# Patient Record
Sex: MALE | Race: WHITE | Employment: OTHER | URBAN - METROPOLITAN AREA
[De-identification: names, ages, dates, MRNs, and addresses within clinical notes are randomized per-mention and may not be internally consistent; named-entity substitution may affect disease eponyms.]

---

## 2017-01-09 ENCOUNTER — TELEPHONE (OUTPATIENT)
Dept: FAMILY MEDICINE CLINIC | Age: 27
End: 2017-01-09

## 2017-01-09 DIAGNOSIS — R79.89 ELEVATED LFTS: Primary | ICD-10-CM

## 2017-02-14 ENCOUNTER — OFFICE VISIT (OUTPATIENT)
Dept: FAMILY MEDICINE CLINIC | Age: 27
End: 2017-02-14

## 2017-02-14 VITALS
RESPIRATION RATE: 18 BRPM | SYSTOLIC BLOOD PRESSURE: 146 MMHG | WEIGHT: 220.2 LBS | BODY MASS INDEX: 28.26 KG/M2 | HEIGHT: 74 IN | DIASTOLIC BLOOD PRESSURE: 92 MMHG | HEART RATE: 112 BPM

## 2017-02-14 DIAGNOSIS — R79.89 ABNORMAL LFTS (LIVER FUNCTION TESTS): Primary | ICD-10-CM

## 2017-02-14 PROCEDURE — 99213 OFFICE O/P EST LOW 20 MIN: CPT | Performed by: INTERNAL MEDICINE

## 2017-02-14 ASSESSMENT — ENCOUNTER SYMPTOMS
RESPIRATORY NEGATIVE: 1
GASTROINTESTINAL NEGATIVE: 1

## 2017-05-18 ENCOUNTER — OFFICE VISIT (OUTPATIENT)
Dept: FAMILY MEDICINE CLINIC | Age: 27
End: 2017-05-18

## 2017-05-18 VITALS
DIASTOLIC BLOOD PRESSURE: 84 MMHG | WEIGHT: 211.4 LBS | SYSTOLIC BLOOD PRESSURE: 128 MMHG | HEIGHT: 74 IN | RESPIRATION RATE: 18 BRPM | BODY MASS INDEX: 27.13 KG/M2 | HEART RATE: 80 BPM

## 2017-05-18 DIAGNOSIS — L72.9 CYST OF SCROTUM: Primary | ICD-10-CM

## 2017-05-18 PROCEDURE — 99212 OFFICE O/P EST SF 10 MIN: CPT | Performed by: INTERNAL MEDICINE

## 2017-05-18 ASSESSMENT — ENCOUNTER SYMPTOMS
ALLERGIC/IMMUNOLOGIC NEGATIVE: 1
RESPIRATORY NEGATIVE: 1
GASTROINTESTINAL NEGATIVE: 1

## 2018-07-12 ENCOUNTER — OFFICE VISIT (OUTPATIENT)
Dept: FAMILY MEDICINE CLINIC | Age: 28
End: 2018-07-12

## 2018-07-12 VITALS
BODY MASS INDEX: 28.18 KG/M2 | DIASTOLIC BLOOD PRESSURE: 80 MMHG | HEART RATE: 70 BPM | RESPIRATION RATE: 16 BRPM | HEIGHT: 74 IN | SYSTOLIC BLOOD PRESSURE: 126 MMHG | WEIGHT: 219.6 LBS | OXYGEN SATURATION: 98 %

## 2018-07-12 DIAGNOSIS — G47.33 OBSTRUCTIVE SLEEP APNEA SYNDROME: ICD-10-CM

## 2018-07-12 DIAGNOSIS — B36.0 TINEA VERSICOLOR: ICD-10-CM

## 2018-07-12 DIAGNOSIS — R79.89 ABNORMAL LFTS (LIVER FUNCTION TESTS): ICD-10-CM

## 2018-07-12 LAB
ALBUMIN SERPL-MCNC: 4.8 G/DL (ref 3.4–5)
ALP BLD-CCNC: 52 U/L (ref 40–129)
ALT SERPL-CCNC: 39 U/L (ref 10–40)
ANION GAP SERPL CALCULATED.3IONS-SCNC: 13 MMOL/L (ref 3–16)
AST SERPL-CCNC: 27 U/L (ref 15–37)
BILIRUB SERPL-MCNC: 1.2 MG/DL (ref 0–1)
BILIRUBIN DIRECT: <0.2 MG/DL (ref 0–0.3)
BILIRUBIN, INDIRECT: ABNORMAL MG/DL (ref 0–1)
BUN BLDV-MCNC: 15 MG/DL (ref 7–20)
CALCIUM SERPL-MCNC: 9.2 MG/DL (ref 8.3–10.6)
CHLORIDE BLD-SCNC: 103 MMOL/L (ref 99–110)
CHOLESTEROL, TOTAL: 182 MG/DL (ref 0–199)
CO2: 24 MMOL/L (ref 21–32)
CREAT SERPL-MCNC: 1.2 MG/DL (ref 0.9–1.3)
GFR AFRICAN AMERICAN: >60
GFR NON-AFRICAN AMERICAN: >60
GLUCOSE BLD-MCNC: 87 MG/DL (ref 70–99)
HDLC SERPL-MCNC: 43 MG/DL (ref 40–60)
LDL CHOLESTEROL CALCULATED: 121 MG/DL
POTASSIUM SERPL-SCNC: 4.5 MMOL/L (ref 3.5–5.1)
SODIUM BLD-SCNC: 140 MMOL/L (ref 136–145)
TOTAL PROTEIN: 6.7 G/DL (ref 6.4–8.2)
TRIGL SERPL-MCNC: 89 MG/DL (ref 0–150)
VLDLC SERPL CALC-MCNC: 18 MG/DL

## 2018-07-12 PROCEDURE — 36415 COLL VENOUS BLD VENIPUNCTURE: CPT | Performed by: INTERNAL MEDICINE

## 2018-07-12 PROCEDURE — 99214 OFFICE O/P EST MOD 30 MIN: CPT | Performed by: INTERNAL MEDICINE

## 2018-07-12 ASSESSMENT — ENCOUNTER SYMPTOMS
GASTROINTESTINAL NEGATIVE: 1
RESPIRATORY NEGATIVE: 1
ALLERGIC/IMMUNOLOGIC NEGATIVE: 1

## 2018-07-12 NOTE — PROGRESS NOTES
Cardiovascular: Normal rate, regular rhythm, normal heart sounds, intact distal pulses and normal pulses. No extrasystoles are present. PMI is not displaced. Exam reveals no gallop and no friction rub. No murmur heard. Pulses:       Carotid pulses are 2+ on the right side, and 2+ on the left side. Radial pulses are 2+ on the right side, and 2+ on the left side. Femoral pulses are 2+ on the right side, and 2+ on the left side. Popliteal pulses are 2+ on the right side, and 2+ on the left side. Dorsalis pedis pulses are 2+ on the right side, and 2+ on the left side. Posterior tibial pulses are 2+ on the right side, and 2+ on the left side. Pulmonary/Chest: Effort normal and breath sounds normal. No accessory muscle usage. No apnea, no tachypnea and no bradypnea. No respiratory distress. He has no decreased breath sounds. He has no wheezes. He has no rhonchi. He has no rales. Abdominal: Soft. Normal appearance, normal aorta and bowel sounds are normal. He exhibits no distension and no mass. There is no hepatosplenomegaly. There is no tenderness. There is no rebound, no guarding and no CVA tenderness. No hernia. Hernia confirmed negative in the ventral area. Neurological: He is alert and oriented to person, place, and time. He has normal strength. He is not disoriented. He displays no atrophy and no tremor. No cranial nerve deficit or sensory deficit. He exhibits normal muscle tone. He displays a negative Romberg sign. Coordination normal.   Skin: Skin is warm, dry and intact. No abrasion and no rash noted. He is not diaphoretic. No cyanosis or erythema. No pallor. Nails show no clubbing. Benign nevi scalp. Psychiatric: He has a normal mood and affect. His speech is normal and behavior is normal. Judgment and thought content normal. Cognition and memory are normal.       Assessment:      Problem   Abnormal Lfts (Liver Function Tests). Wt up some.     Obstructive Sleep Apnea Syndrome. Hasn't done testing yet. Tinea Versicolor. Reoccurrence. Plan:     All above problems reviewed and the found to be unchanged except for the following :     Abnormal Lfts (Liver Function Tests). Will check labs. Obstructive Sleep Apnea Syndrome. Will do test.    Tinea Versicolor. If labs ok will tx.

## 2018-07-15 RX ORDER — FLUCONAZOLE 150 MG/1
TABLET ORAL
Qty: 2 TABLET | Refills: 1 | Status: SHIPPED | OUTPATIENT
Start: 2018-07-15 | End: 2018-09-27 | Stop reason: ALTCHOICE

## 2018-07-16 ENCOUNTER — TELEPHONE (OUTPATIENT)
Dept: FAMILY MEDICINE CLINIC | Age: 28
End: 2018-07-16

## 2018-07-16 NOTE — TELEPHONE ENCOUNTER
----- Message from Cristian Sales LPN sent at 8/91/0525 11:52 AM EDT -----      ----- Message -----  From: Alaina Leung MD  Sent: 7/15/2018   5:41 PM  To: Cristian Sales LPN    Diflucan 696 mg one dose. Repeat in 1 week. If not cleared, refill Rx and repeat.  (rx sent)

## 2018-09-27 ENCOUNTER — OFFICE VISIT (OUTPATIENT)
Dept: PULMONOLOGY | Age: 28
End: 2018-09-27
Payer: COMMERCIAL

## 2018-09-27 VITALS
DIASTOLIC BLOOD PRESSURE: 62 MMHG | RESPIRATION RATE: 16 BRPM | BODY MASS INDEX: 28.62 KG/M2 | TEMPERATURE: 99.3 F | HEART RATE: 68 BPM | HEIGHT: 74 IN | SYSTOLIC BLOOD PRESSURE: 117 MMHG | WEIGHT: 223 LBS | OXYGEN SATURATION: 98 %

## 2018-09-27 DIAGNOSIS — R53.83 OTHER FATIGUE: ICD-10-CM

## 2018-09-27 DIAGNOSIS — R06.83 SNORING: Primary | ICD-10-CM

## 2018-09-27 DIAGNOSIS — G47.30 OBSERVED SLEEP APNEA: ICD-10-CM

## 2018-09-27 DIAGNOSIS — R29.818 SUSPECTED SLEEP APNEA: ICD-10-CM

## 2018-09-27 PROCEDURE — 99243 OFF/OP CNSLTJ NEW/EST LOW 30: CPT | Performed by: NURSE PRACTITIONER

## 2018-09-27 ASSESSMENT — SLEEP AND FATIGUE QUESTIONNAIRES
HOW LIKELY ARE YOU TO NOD OFF OR FALL ASLEEP WHILE WATCHING TV: 0
HOW LIKELY ARE YOU TO NOD OFF OR FALL ASLEEP IN A CAR, WHILE STOPPED FOR A FEW MINUTES IN TRAFFIC: 0
HOW LIKELY ARE YOU TO NOD OFF OR FALL ASLEEP WHEN YOU ARE A PASSENGER IN A CAR FOR AN HOUR WITHOUT A BREAK: 0
HOW LIKELY ARE YOU TO NOD OFF OR FALL ASLEEP WHILE LYING DOWN TO REST IN THE AFTERNOON WHEN CIRCUMSTANCES PERMIT: 1
ESS TOTAL SCORE: 1
HOW LIKELY ARE YOU TO NOD OFF OR FALL ASLEEP WHILE SITTING QUIETLY AFTER LUNCH WITHOUT ALCOHOL: 0
HOW LIKELY ARE YOU TO NOD OFF OR FALL ASLEEP WHILE SITTING INACTIVE IN A PUBLIC PLACE: 0
HOW LIKELY ARE YOU TO NOD OFF OR FALL ASLEEP WHILE SITTING AND READING: 0
HOW LIKELY ARE YOU TO NOD OFF OR FALL ASLEEP WHILE SITTING AND TALKING TO SOMEONE: 0
NECK CIRCUMFERENCE (INCHES): 16.75

## 2018-09-27 NOTE — PROGRESS NOTES
Orders verbalized by Savanna Diaz CNP;  HST Oct 4   F/U to discuss Oct 10
for sleep related breathing disorder. · Appointment after HST to discuss results  · Treatment options were discussed with patient if HST reveals LEOPOLDO, including CPAP/APAP therapy, oral appliances and upper airway surgery. · Sleep hygiene  · Avoid sedatives, alcohol and caffeinated drinks at bed time. · No driving motorized vehicles or operating heavy machinery while fatigue, drowsy or sleepy. · Weight loss is also recommended as a long-term intervention. · Complications of LEOPOLDO if not treated were discussed with patient patient to include systemic hypertension, pulmonary hypertension, cardiovascular morbidities, car accidents and all cause mortality. Discussed pathophysiology of LEOPOLDO with patient today- video shown in office.   Patient education handout provided regarding sleep tips   Patient declined flu shot today

## 2018-10-04 ENCOUNTER — HOSPITAL ENCOUNTER (OUTPATIENT)
Dept: SLEEP CENTER | Age: 28
Discharge: HOME | End: 2018-10-06
Payer: COMMERCIAL

## 2018-10-04 DIAGNOSIS — R06.83 SNORING: ICD-10-CM

## 2018-10-04 DIAGNOSIS — R29.818 SUSPECTED SLEEP APNEA: ICD-10-CM

## 2018-10-04 DIAGNOSIS — R53.83 OTHER FATIGUE: ICD-10-CM

## 2018-10-04 DIAGNOSIS — G47.30 OBSERVED SLEEP APNEA: ICD-10-CM

## 2018-10-04 PROCEDURE — 95806 SLEEP STUDY UNATT&RESP EFFT: CPT

## 2018-10-09 ENCOUNTER — TELEPHONE (OUTPATIENT)
Dept: PULMONOLOGY | Age: 28
End: 2018-10-09

## 2018-10-09 NOTE — TELEPHONE ENCOUNTER
Pt informed of Tika's response. Before he proceeds with scheduling titration, he said Marc Rodriguez talked about getting a mouth guard in place of a CPAP. He wants to know what option would be better.

## 2018-10-10 ENCOUNTER — OFFICE VISIT (OUTPATIENT)
Dept: PULMONOLOGY | Age: 28
End: 2018-10-10
Payer: COMMERCIAL

## 2018-10-10 VITALS
DIASTOLIC BLOOD PRESSURE: 76 MMHG | RESPIRATION RATE: 16 BRPM | OXYGEN SATURATION: 97 % | HEIGHT: 74 IN | BODY MASS INDEX: 28.75 KG/M2 | WEIGHT: 224 LBS | HEART RATE: 84 BPM | SYSTOLIC BLOOD PRESSURE: 124 MMHG | TEMPERATURE: 99.4 F

## 2018-10-10 DIAGNOSIS — R53.83 OTHER FATIGUE: ICD-10-CM

## 2018-10-10 DIAGNOSIS — Z71.89 CPAP USE COUNSELING: ICD-10-CM

## 2018-10-10 DIAGNOSIS — R06.83 SNORING: ICD-10-CM

## 2018-10-10 DIAGNOSIS — G47.33 OBSTRUCTIVE SLEEP APNEA SYNDROME: Primary | ICD-10-CM

## 2018-10-10 PROCEDURE — 99213 OFFICE O/P EST LOW 20 MIN: CPT | Performed by: NURSE PRACTITIONER

## 2018-10-10 ASSESSMENT — SLEEP AND FATIGUE QUESTIONNAIRES
HOW LIKELY ARE YOU TO NOD OFF OR FALL ASLEEP WHILE SITTING INACTIVE IN A PUBLIC PLACE: 0
NECK CIRCUMFERENCE (INCHES): 17
HOW LIKELY ARE YOU TO NOD OFF OR FALL ASLEEP WHILE WATCHING TV: 0
ESS TOTAL SCORE: 3
HOW LIKELY ARE YOU TO NOD OFF OR FALL ASLEEP WHILE SITTING AND TALKING TO SOMEONE: 0
HOW LIKELY ARE YOU TO NOD OFF OR FALL ASLEEP WHEN YOU ARE A PASSENGER IN A CAR FOR AN HOUR WITHOUT A BREAK: 0
HOW LIKELY ARE YOU TO NOD OFF OR FALL ASLEEP WHILE SITTING QUIETLY AFTER LUNCH WITHOUT ALCOHOL: 0
HOW LIKELY ARE YOU TO NOD OFF OR FALL ASLEEP WHILE LYING DOWN TO REST IN THE AFTERNOON WHEN CIRCUMSTANCES PERMIT: 3
HOW LIKELY ARE YOU TO NOD OFF OR FALL ASLEEP WHILE SITTING AND READING: 0
HOW LIKELY ARE YOU TO NOD OFF OR FALL ASLEEP IN A CAR, WHILE STOPPED FOR A FEW MINUTES IN TRAFFIC: 0

## 2018-10-10 NOTE — PROGRESS NOTES
Patient ID: Brandy Plasencia is a 25 y.o. male who is being seen today for   Chief Complaint   Patient presents with    Sleep Apnea     Discuss     Referring: Dr. Eneida Her    HPI:   Brandy Plasencia is a 25 y.o. male in office for LEOPOLDO follow up. HST was reviewed by me and noted below. It showed moderate LEOPOLDO. Results were dicussed with patient and multiple good questions were answered. He is unsure if CPAP will be a good option for him. He is also interested in oral appliance for sleep apnea. +Snoring. Some  daytime sleepiness. No nodding off when driving. Sometimes fatigue. Bedtime is MN-3am and rise time is 9-11 am. Sleep onset is <30minutes. Wakes up 0-1 times at night total. 0-1 nocturia. It takes few minutes to fall back a sleep. Rare naps during the day. Sometimes headache in am. No weight gain. 2-3 caffienated beverages during the day. Occasional  alcohol. ESS is 3      Initial HPI 9/27/18  Brandy Plasencia is a 25 y.o. male in office for LEOPOLDO/snoring evaluation. Patient reports snoring at night for the past 20-25 years. Worse in supine position. Wakes self snoring. Has witnessed apnea. No restorative sleep. +dry mouth upon awakening. Sometimes fatigue and tiredness during the day. No history of sleep apnea. Bedtime MN-3 am  and rise time is 9-11 am. It takes <30 minutes to fall asleep. +TV in bedroom. 0-1 nocturia. Wakes up 0-1 times at night. It takes few minutes to fall back a sleep. Takes rare nap during the day. Sometimes headache in am. No car wrecks or near wrecks because of the sleepiness. No nodding off while driving. Gained 10-15 pounds in the past few years. Sometimes forgetfulness or decreased concentration. Sometimes nasal congestion at night. Drinks 2-3 caffinated beverages per day. 3-4 days a week 2-3 drinks alcohol. No restless feelings in legs at night. Maybe teeth grinding. No nightmares. No sleep walking. No night time panic attacks. No narcotics. No drug abuse.  No history of depression. +history of anxiety. No history of atrial fibrillation. No history of DM. No history of HTN. No history of ischemic heart disease. No history of stroke. ESS is 1. No smoking. No FH for LEOPOLDO, RLS or narcolepsy. Sleep Medicine 10/10/2018 9/27/2018   Sitting and reading 0 0   Watching TV 0 0   Sitting, inactive in a public place (e.g. a theatre or a meeting) 0 0   As a passenger in a car for an hour without a break 0 0   Lying down to rest in the afternoon when circumstances permit 3 1   Sitting and talking to someone 0 0   Sitting quietly after a lunch without alcohol 0 0   In a car, while stopped for a few minutes in traffic 0 0   Total score 3 1   Neck circumference 17 16.75       Past Medical History:  Past Medical History:   Diagnosis Date    Sleep apnea        Past Surgical History:        Procedure Laterality Date    FASCIOTOMY         Allergies:  is allergic to diclofenac and penicillins. Social History:    TOBACCO:   reports that he quit smoking about 4 years ago. He has never used smokeless tobacco.  ETOH:   reports that he drinks about 3.0 oz of alcohol per week . Family History:   History reviewed. No pertinent family history. Current Medications:  No current outpatient prescriptions on file. Review of Systems        Objective:   PHYSICAL EXAM:    /76 (Site: Left Upper Arm, Position: Sitting)   Pulse 84   Temp 99.4 °F (37.4 °C) (Oral)   Resp 16   Ht 6' 2\" (1.88 m)   Wt 224 lb (101.6 kg)   SpO2 97%   BMI 28.76 kg/m²     Physical Exam  Gen: No acute distress. BMI of 28.76  Eyes: PERRL. No sclera icterus. No conjunctival injection. ENT: No discharge. Pharynx clear. Mallampati class IV. Neck: Trachea midline. No obvious mass. Neck circumference 17\"  Resp: No accessory muscle use. No crackles. No wheezes. No rhonchi. CV: Regular rate. Regular rhythm. No murmur or rub. Skin: Warm and dry. M/S: No cyanosis. No obvious joint deformity. Neuro: Awake. Alert.  Moves all

## 2018-10-11 ENCOUNTER — TELEPHONE (OUTPATIENT)
Dept: PULMONOLOGY | Age: 28
End: 2018-10-11

## 2018-10-11 DIAGNOSIS — G47.33 OSA (OBSTRUCTIVE SLEEP APNEA): Primary | ICD-10-CM

## 2018-10-18 NOTE — TELEPHONE ENCOUNTER
I s/w patient. He has not found a company yet. He plans to work on this today or tomorrow and hopes to have an answer by early next week. I told him we would follow up next week if he hasn't called us. He also stated that Dr. Ashley Tim office called him to set up a consultation. He plans to call them back to set up that appt as well.

## 2019-01-11 ENCOUNTER — OFFICE VISIT (OUTPATIENT)
Dept: FAMILY MEDICINE CLINIC | Age: 29
End: 2019-01-11
Payer: COMMERCIAL

## 2019-01-11 VITALS
SYSTOLIC BLOOD PRESSURE: 138 MMHG | BODY MASS INDEX: 29.77 KG/M2 | OXYGEN SATURATION: 98 % | HEART RATE: 82 BPM | WEIGHT: 232 LBS | DIASTOLIC BLOOD PRESSURE: 80 MMHG | HEIGHT: 74 IN

## 2019-01-11 DIAGNOSIS — B35.9 RINGWORM: Primary | ICD-10-CM

## 2019-01-11 PROCEDURE — 99213 OFFICE O/P EST LOW 20 MIN: CPT | Performed by: PHYSICIAN ASSISTANT

## 2019-01-11 RX ORDER — FLUCONAZOLE 150 MG/1
TABLET ORAL
Qty: 2 TABLET | Refills: 0 | Status: SHIPPED | OUTPATIENT
Start: 2019-01-11 | End: 2019-01-31

## 2019-01-11 ASSESSMENT — ENCOUNTER SYMPTOMS: RESPIRATORY NEGATIVE: 1

## 2019-01-11 ASSESSMENT — PATIENT HEALTH QUESTIONNAIRE - PHQ9
SUM OF ALL RESPONSES TO PHQ9 QUESTIONS 1 & 2: 0
SUM OF ALL RESPONSES TO PHQ QUESTIONS 1-9: 0
SUM OF ALL RESPONSES TO PHQ QUESTIONS 1-9: 0
1. LITTLE INTEREST OR PLEASURE IN DOING THINGS: 0
2. FEELING DOWN, DEPRESSED OR HOPELESS: 0

## 2019-01-18 ENCOUNTER — TELEPHONE (OUTPATIENT)
Dept: PULMONOLOGY | Age: 29
End: 2019-01-18

## 2019-01-28 ENCOUNTER — TELEPHONE (OUTPATIENT)
Dept: FAMILY MEDICINE CLINIC | Age: 29
End: 2019-01-28

## 2019-01-28 DIAGNOSIS — R21 RASH: Primary | ICD-10-CM

## 2019-01-31 ENCOUNTER — OFFICE VISIT (OUTPATIENT)
Dept: PULMONOLOGY | Age: 29
End: 2019-01-31
Payer: COMMERCIAL

## 2019-01-31 VITALS
SYSTOLIC BLOOD PRESSURE: 120 MMHG | HEIGHT: 74 IN | RESPIRATION RATE: 16 BRPM | HEART RATE: 72 BPM | TEMPERATURE: 97.2 F | DIASTOLIC BLOOD PRESSURE: 72 MMHG | WEIGHT: 233 LBS | BODY MASS INDEX: 29.9 KG/M2 | OXYGEN SATURATION: 98 %

## 2019-01-31 DIAGNOSIS — R53.83 OTHER FATIGUE: ICD-10-CM

## 2019-01-31 DIAGNOSIS — G47.33 OBSTRUCTIVE SLEEP APNEA SYNDROME: Primary | ICD-10-CM

## 2019-01-31 DIAGNOSIS — Z71.89 CPAP USE COUNSELING: ICD-10-CM

## 2019-01-31 PROCEDURE — 99213 OFFICE O/P EST LOW 20 MIN: CPT | Performed by: NURSE PRACTITIONER

## 2019-01-31 ASSESSMENT — SLEEP AND FATIGUE QUESTIONNAIRES
HOW LIKELY ARE YOU TO NOD OFF OR FALL ASLEEP WHILE SITTING AND READING: 0
NECK CIRCUMFERENCE (INCHES): 17
HOW LIKELY ARE YOU TO NOD OFF OR FALL ASLEEP WHEN YOU ARE A PASSENGER IN A CAR FOR AN HOUR WITHOUT A BREAK: 0
HOW LIKELY ARE YOU TO NOD OFF OR FALL ASLEEP WHILE LYING DOWN TO REST IN THE AFTERNOON WHEN CIRCUMSTANCES PERMIT: 1
ESS TOTAL SCORE: 1
HOW LIKELY ARE YOU TO NOD OFF OR FALL ASLEEP IN A CAR, WHILE STOPPED FOR A FEW MINUTES IN TRAFFIC: 0
HOW LIKELY ARE YOU TO NOD OFF OR FALL ASLEEP WHILE SITTING QUIETLY AFTER LUNCH WITHOUT ALCOHOL: 0
HOW LIKELY ARE YOU TO NOD OFF OR FALL ASLEEP WHILE SITTING AND TALKING TO SOMEONE: 0
HOW LIKELY ARE YOU TO NOD OFF OR FALL ASLEEP WHILE WATCHING TV: 0
HOW LIKELY ARE YOU TO NOD OFF OR FALL ASLEEP WHILE SITTING INACTIVE IN A PUBLIC PLACE: 0

## 2019-02-11 ENCOUNTER — TELEPHONE (OUTPATIENT)
Dept: FAMILY MEDICINE CLINIC | Age: 29
End: 2019-02-11

## 2019-02-21 ENCOUNTER — TELEPHONE (OUTPATIENT)
Dept: PULMONOLOGY | Age: 29
End: 2019-02-21

## 2019-03-28 ENCOUNTER — OFFICE VISIT (OUTPATIENT)
Dept: PULMONOLOGY | Age: 29
End: 2019-03-28
Payer: COMMERCIAL

## 2019-03-28 VITALS
RESPIRATION RATE: 16 BRPM | SYSTOLIC BLOOD PRESSURE: 138 MMHG | DIASTOLIC BLOOD PRESSURE: 84 MMHG | TEMPERATURE: 98.2 F | OXYGEN SATURATION: 97 % | WEIGHT: 233 LBS | BODY MASS INDEX: 29.9 KG/M2 | HEART RATE: 84 BPM | HEIGHT: 74 IN

## 2019-03-28 DIAGNOSIS — G47.33 OBSTRUCTIVE SLEEP APNEA SYNDROME: Primary | ICD-10-CM

## 2019-03-28 DIAGNOSIS — Z71.89 CPAP USE COUNSELING: ICD-10-CM

## 2019-03-28 PROCEDURE — 99213 OFFICE O/P EST LOW 20 MIN: CPT | Performed by: NURSE PRACTITIONER

## 2019-03-28 ASSESSMENT — SLEEP AND FATIGUE QUESTIONNAIRES
HOW LIKELY ARE YOU TO NOD OFF OR FALL ASLEEP IN A CAR, WHILE STOPPED FOR A FEW MINUTES IN TRAFFIC: 0
HOW LIKELY ARE YOU TO NOD OFF OR FALL ASLEEP WHILE SITTING AND READING: 0
HOW LIKELY ARE YOU TO NOD OFF OR FALL ASLEEP WHILE WATCHING TV: 0
HOW LIKELY ARE YOU TO NOD OFF OR FALL ASLEEP WHILE SITTING AND TALKING TO SOMEONE: 0
HOW LIKELY ARE YOU TO NOD OFF OR FALL ASLEEP WHILE LYING DOWN TO REST IN THE AFTERNOON WHEN CIRCUMSTANCES PERMIT: 0
ESS TOTAL SCORE: 0
HOW LIKELY ARE YOU TO NOD OFF OR FALL ASLEEP WHILE SITTING QUIETLY AFTER LUNCH WITHOUT ALCOHOL: 0
NECK CIRCUMFERENCE (INCHES): 17
HOW LIKELY ARE YOU TO NOD OFF OR FALL ASLEEP WHEN YOU ARE A PASSENGER IN A CAR FOR AN HOUR WITHOUT A BREAK: 0
HOW LIKELY ARE YOU TO NOD OFF OR FALL ASLEEP WHILE SITTING INACTIVE IN A PUBLIC PLACE: 0

## 2019-04-10 ENCOUNTER — TELEPHONE (OUTPATIENT)
Dept: PULMONOLOGY | Age: 29
End: 2019-04-10

## 2019-04-10 NOTE — TELEPHONE ENCOUNTER
Reviewed scanned letter from [facility] about CPAP noncompliance. Just had office visit with patient on 3/28/19. He is improving with CPAP and wishes to continue use.   Please call DME to see what he needs to do to continue with CPAP

## 2019-05-13 ENCOUNTER — TELEPHONE (OUTPATIENT)
Dept: PULMONOLOGY | Age: 29
End: 2019-05-13

## 2019-06-05 ENCOUNTER — TELEPHONE (OUTPATIENT)
Dept: PULMONOLOGY | Age: 29
End: 2019-06-05

## 2019-06-05 NOTE — TELEPHONE ENCOUNTER
Patient did not show for 6 week sleep appointment  with Jaun Fink on 6/5/19    Same Day Cancellation: No    Patient rescheduled:  No        Patient was also no show on: N/A    LOV 3/28/19       Assessment:       · Moderate LEOPOLDO. Auto CPAP 7-13 cm H2O. Nearing compliance. · Snoring-resolved on CPAP  · Observed sleep apnea -resolved on CPAP  · Fatigue-improving with CPAP use.         Plan:       · Changed to auto CPAP 7-13 cm H2O  · Mask fitting in office with RT  · Try CPAP pillow  · Discussed adjusting humidification  · Discussed acclimation techniques. · Advised to use CPAP 6-8 hrs at night and during naps- need to increase use. · Replacement of mask, tubing, head straps every 3-6 months or sooner if damaged. · Patient instructed to contact DME company for any mask, tubing or machine trouble shooting if problems arise. · Sleep hygiene  · Avoid sedatives, alcohol and caffeinated drinks at bed time. · No driving motorized vehicles or operating heavy machinery while fatigue, drowsy or sleepy. · Weight loss is also recommended as a long-term intervention. · Complications of LEOPOLDO if not treated were discussed with patient patient to include systemic hypertension, pulmonary hypertension, cardiovascular morbidities, car accidents and all cause mortality.   · Patient education handout provided regarding sleep tips and CPAP cleaning recommendations         Follow up 6-8 weeks, sooner if needed

## 2019-07-26 ENCOUNTER — OFFICE VISIT (OUTPATIENT)
Dept: FAMILY MEDICINE CLINIC | Age: 29
End: 2019-07-26
Payer: COMMERCIAL

## 2019-07-26 VITALS
HEART RATE: 88 BPM | BODY MASS INDEX: 29.26 KG/M2 | RESPIRATION RATE: 16 BRPM | OXYGEN SATURATION: 98 % | DIASTOLIC BLOOD PRESSURE: 74 MMHG | WEIGHT: 228 LBS | HEIGHT: 74 IN | SYSTOLIC BLOOD PRESSURE: 124 MMHG

## 2019-07-26 DIAGNOSIS — B36.0 TINEA VERSICOLOR: ICD-10-CM

## 2019-07-26 DIAGNOSIS — G47.33 OBSTRUCTIVE SLEEP APNEA SYNDROME: ICD-10-CM

## 2019-07-26 DIAGNOSIS — Z11.4 SCREENING FOR HIV (HUMAN IMMUNODEFICIENCY VIRUS): ICD-10-CM

## 2019-07-26 DIAGNOSIS — E80.4 GILBERTS SYNDROME: ICD-10-CM

## 2019-07-26 DIAGNOSIS — Z00.00 ANNUAL PHYSICAL EXAM: Primary | ICD-10-CM

## 2019-07-26 DIAGNOSIS — R53.83 FATIGUE, UNSPECIFIED TYPE: ICD-10-CM

## 2019-07-26 PROCEDURE — 99395 PREV VISIT EST AGE 18-39: CPT | Performed by: INTERNAL MEDICINE

## 2019-07-26 RX ORDER — KETOCONAZOLE 20 MG/G
CREAM TOPICAL
COMMUNITY
Start: 2019-04-19

## 2019-07-26 ASSESSMENT — ENCOUNTER SYMPTOMS
EYES NEGATIVE: 1
RESPIRATORY NEGATIVE: 1
GASTROINTESTINAL NEGATIVE: 1
ALLERGIC/IMMUNOLOGIC NEGATIVE: 1

## 2019-07-26 NOTE — PROGRESS NOTES
(103.4 kg)    Height: 6' 2\" (1.88 m)      Objective:   Physical Exam   Constitutional: He is oriented to person, place, and time. Vital signs are normal. He appears well-developed and well-nourished. Non-toxic appearance. He does not have a sickly appearance. He does not appear ill. No distress. HENT:   Head: Normocephalic and atraumatic. Right Ear: Hearing, tympanic membrane, external ear and ear canal normal.   Left Ear: Hearing, tympanic membrane, external ear and ear canal normal.   Nose: Nose normal. Right sinus exhibits no maxillary sinus tenderness and no frontal sinus tenderness. Left sinus exhibits no maxillary sinus tenderness and no frontal sinus tenderness. Mouth/Throat: Uvula is midline, oropharynx is clear and moist and mucous membranes are normal. No oropharyngeal exudate. Eyes: Pupils are equal, round, and reactive to light. Conjunctivae, EOM and lids are normal. Right eye exhibits no discharge. Left eye exhibits no discharge. No scleral icterus. Fundoscopic exam:       The right eye shows no arteriolar narrowing, no AV nicking, no exudate, no hemorrhage and no papilledema. The right eye shows no red reflex and no venous pulsations. The left eye shows no arteriolar narrowing, no AV nicking, no exudate, no hemorrhage and no papilledema. The left eye shows no red reflex and no venous pulsations. Neck: Trachea normal, normal range of motion and full passive range of motion without pain. Neck supple. Normal carotid pulses, no hepatojugular reflux and no JVD present. Carotid bruit is not present. No tracheal deviation present. No thyroid mass and no thyromegaly present. Cardiovascular: Normal rate, regular rhythm, normal heart sounds, intact distal pulses and normal pulses. No extrasystoles are present. PMI is not displaced. Exam reveals no gallop, no friction rub and no decreased pulses. No murmur heard.   Pulses:       Carotid pulses are 2+ on the right side, and 2+ on the left

## 2019-08-29 ENCOUNTER — OFFICE VISIT (OUTPATIENT)
Dept: PULMONOLOGY | Age: 29
End: 2019-08-29
Payer: COMMERCIAL

## 2019-08-29 VITALS
WEIGHT: 226 LBS | OXYGEN SATURATION: 97 % | HEART RATE: 83 BPM | TEMPERATURE: 98.3 F | HEIGHT: 74 IN | SYSTOLIC BLOOD PRESSURE: 131 MMHG | BODY MASS INDEX: 29 KG/M2 | RESPIRATION RATE: 16 BRPM | DIASTOLIC BLOOD PRESSURE: 80 MMHG

## 2019-08-29 DIAGNOSIS — Z71.89 CPAP USE COUNSELING: ICD-10-CM

## 2019-08-29 DIAGNOSIS — G47.33 OBSTRUCTIVE SLEEP APNEA SYNDROME: Primary | ICD-10-CM

## 2019-08-29 PROCEDURE — 99213 OFFICE O/P EST LOW 20 MIN: CPT | Performed by: NURSE PRACTITIONER

## 2019-08-29 ASSESSMENT — SLEEP AND FATIGUE QUESTIONNAIRES
HOW LIKELY ARE YOU TO NOD OFF OR FALL ASLEEP WHEN YOU ARE A PASSENGER IN A CAR FOR AN HOUR WITHOUT A BREAK: 0
HOW LIKELY ARE YOU TO NOD OFF OR FALL ASLEEP WHILE SITTING AND TALKING TO SOMEONE: 0
HOW LIKELY ARE YOU TO NOD OFF OR FALL ASLEEP WHILE WATCHING TV: 0
ESS TOTAL SCORE: 0
HOW LIKELY ARE YOU TO NOD OFF OR FALL ASLEEP WHILE SITTING INACTIVE IN A PUBLIC PLACE: 0
HOW LIKELY ARE YOU TO NOD OFF OR FALL ASLEEP WHILE SITTING QUIETLY AFTER LUNCH WITHOUT ALCOHOL: 0
HOW LIKELY ARE YOU TO NOD OFF OR FALL ASLEEP IN A CAR, WHILE STOPPED FOR A FEW MINUTES IN TRAFFIC: 0
NECK CIRCUMFERENCE (INCHES): 17
HOW LIKELY ARE YOU TO NOD OFF OR FALL ASLEEP WHILE LYING DOWN TO REST IN THE AFTERNOON WHEN CIRCUMSTANCES PERMIT: 0
HOW LIKELY ARE YOU TO NOD OFF OR FALL ASLEEP WHILE SITTING AND READING: 0

## 2019-08-29 NOTE — PATIENT INSTRUCTIONS
something boring until you feel sleepy. Sit quietly in the dark or read the warranty on your refrigerator. Don't expose yourself to bright light while you are up, it gives cues to your brain that it is time to wake up. 11- Only use your bed for sleeping: Dont use the bed as an office, workroom or recreation room. Let your body \"know\" that the bed is associated with sleeping  12- Use comfortable bedding. Uncomfortable bedding can prevent good sleep. Evaluate whether or not this is a source of your problem, and make appropriate changes. 13- Make sure your bed and bedroom are quiet and comfortable: A hot room can be uncomfortable. A cooler room, along with enough blankets to stay warm is recommended. Get a blackout shade or wear a slumber mask and wear earplugs or get a \"white noise\" machine for light and noise distractions. 14- Use sunlight to set your biological clock: When you get up in the morning, go outside and turn your face to the sun for 15 minutes. 13- Dont take your worries to bed: Leave worries about job, school, daily life, etc., behind when you go to bed. Some people find it useful to assign a \"worry period\" during the evening or afternoon for these issues.     CPAP Equipment Cleaning and Disinfecting Schedule  Equipment Cleaning Frequency Instructions  Disinfecting Frequency   Non-Disposable Filters  Weekly Mild soapy water, Rinse, Air Dry Not Required   Disposable Filters Change as needed  2-4 weeks Do Not Wash Not Required   Hose/tubing Daily Mild soapy water, Rinse, Air Dry Once a week   Mask / Nasal Pillows Daily Mild soapy water, Rinse, Air Dry Once a week   Headgear Weekly Hand wash, Mild soapy water, Rinse, Dry  Not Required   Humidifier Daily Empty water daily  Mild soapy water, Rinse well, Air Dry  Once a week   CPAP Unit As Needed Dust with damp cloth,  No detergents or sprays Not Required         Disinfect (per schedule) with 1 part white vinegar and 3 parts water- soak mask and water

## 2019-08-29 NOTE — PROGRESS NOTES
Patient ID: Marilee Pisano is a 30 y.o. male who is being seen today for   Chief Complaint   Patient presents with    Sleep Apnea     6 week     Referring: Dr. Rodo Castro    HPI:     Marilee Pisano is a 30 y.o. male in office for LEOPOLDO follow up. States he has struggled to use CPAP consistently over last month. States he was previously compliant. STates when he does use it he feels great the next day. States mask bothering him and him being consistent with using CPAP are biggest barriers to use. Patient is using CPAP 2 hrs/night when using it. Using humidifier. No snoring on CPAP. The pressure is well tolerated. The mask is comfortable- full face mask F30. No significant mask leak. No significant daytime sleepiness. No nodding off when driving. Minimal fatigue. Bedtime is 1-2 am and rise time is 9-10 am. Sleep onset is 30 minutes. Wakes up 1-2 times at night total. 1-2 nocturia. It takes few minutes to fall back a sleep. No naps during the day. Occasional headache in am. No weight gain. 2-3 caffienated beverages during the day. Moderate alcohol. ESS is 0. Sleep Medicine 8/29/2019 3/28/2019 1/31/2019 10/10/2018 9/27/2018   Sitting and reading 0 0 0 0 0   Watching TV 0 0 0 0 0   Sitting, inactive in a public place (e.g. a theatre or a meeting) 0 0 0 0 0   As a passenger in a car for an hour without a break 0 0 0 0 0   Lying down to rest in the afternoon when circumstances permit 0 0 1 3 1   Sitting and talking to someone 0 0 0 0 0   Sitting quietly after a lunch without alcohol 0 0 0 0 0   In a car, while stopped for a few minutes in traffic 0 0 0 0 0   Total score 0 0 1 3 1   Neck circumference 17 17 17 17 16.75       Past Medical History:  Past Medical History:   Diagnosis Date    Sleep apnea        Past Surgical History:        Procedure Laterality Date    FASCIOTOMY         Allergies:  is allergic to diclofenac and penicillins.   Social History:    TOBACCO:   reports that he quit smoking about 5 years

## 2020-03-03 ENCOUNTER — TELEPHONE (OUTPATIENT)
Dept: PULMONOLOGY | Age: 30
End: 2020-03-03

## 2020-03-13 NOTE — TELEPHONE ENCOUNTER
Noted. Patient did not keep follow up appointment as was recommended. Please schedule a follow up visit for this patient if he calls requesting such appointment.

## 2021-04-02 ENCOUNTER — IMMUNIZATION (OUTPATIENT)
Dept: PRIMARY CARE CLINIC | Age: 31
End: 2021-04-02
Payer: COMMERCIAL

## 2021-04-02 PROCEDURE — 0011A COVID-19, MODERNA VACCINE 100MCG/0.5ML DOSE: CPT | Performed by: FAMILY MEDICINE

## 2021-04-02 PROCEDURE — 91301 COVID-19, MODERNA VACCINE 100MCG/0.5ML DOSE: CPT | Performed by: FAMILY MEDICINE

## 2021-04-22 ENCOUNTER — OFFICE VISIT (OUTPATIENT)
Dept: FAMILY MEDICINE CLINIC | Age: 31
End: 2021-04-22
Payer: COMMERCIAL

## 2021-04-22 VITALS
DIASTOLIC BLOOD PRESSURE: 84 MMHG | SYSTOLIC BLOOD PRESSURE: 128 MMHG | OXYGEN SATURATION: 99 % | RESPIRATION RATE: 16 BRPM | HEIGHT: 74 IN | BODY MASS INDEX: 28.77 KG/M2 | WEIGHT: 224.2 LBS | HEART RATE: 65 BPM

## 2021-04-22 DIAGNOSIS — G47.33 OBSTRUCTIVE SLEEP APNEA SYNDROME: ICD-10-CM

## 2021-04-22 DIAGNOSIS — Z11.4 SCREENING FOR HIV (HUMAN IMMUNODEFICIENCY VIRUS): ICD-10-CM

## 2021-04-22 DIAGNOSIS — E78.00 PURE HYPERCHOLESTEROLEMIA: ICD-10-CM

## 2021-04-22 DIAGNOSIS — Z00.00 ANNUAL PHYSICAL EXAM: Primary | ICD-10-CM

## 2021-04-22 DIAGNOSIS — R10.32 LLQ ABDOMINAL PAIN: ICD-10-CM

## 2021-04-22 DIAGNOSIS — R79.89 ABNORMAL LFTS (LIVER FUNCTION TESTS): ICD-10-CM

## 2021-04-22 DIAGNOSIS — R53.83 FATIGUE, UNSPECIFIED TYPE: ICD-10-CM

## 2021-04-22 DIAGNOSIS — Z11.59 NEED FOR HEPATITIS C SCREENING TEST: ICD-10-CM

## 2021-04-22 LAB
ALBUMIN SERPL-MCNC: 4.8 G/DL (ref 3.4–5)
ALP BLD-CCNC: 57 U/L (ref 40–129)
ALT SERPL-CCNC: 30 U/L (ref 10–40)
ANION GAP SERPL CALCULATED.3IONS-SCNC: 12 MMOL/L (ref 3–16)
AST SERPL-CCNC: 25 U/L (ref 15–37)
BILIRUB SERPL-MCNC: 0.5 MG/DL (ref 0–1)
BILIRUBIN DIRECT: <0.2 MG/DL (ref 0–0.3)
BILIRUBIN, INDIRECT: NORMAL MG/DL (ref 0–1)
BUN BLDV-MCNC: 15 MG/DL (ref 7–20)
CALCIUM SERPL-MCNC: 9.2 MG/DL (ref 8.3–10.6)
CHLORIDE BLD-SCNC: 108 MMOL/L (ref 99–110)
CHOLESTEROL, TOTAL: 157 MG/DL (ref 0–199)
CO2: 25 MMOL/L (ref 21–32)
CREAT SERPL-MCNC: 1 MG/DL (ref 0.9–1.3)
GFR AFRICAN AMERICAN: >60
GFR NON-AFRICAN AMERICAN: >60
GLUCOSE BLD-MCNC: 95 MG/DL (ref 70–99)
HCT VFR BLD CALC: 43.7 % (ref 40.5–52.5)
HDLC SERPL-MCNC: 37 MG/DL (ref 40–60)
HEMOGLOBIN: 15.1 G/DL (ref 13.5–17.5)
HEPATITIS C ANTIBODY INTERPRETATION: NORMAL
LDL CHOLESTEROL CALCULATED: 105 MG/DL
MCH RBC QN AUTO: 32.3 PG (ref 26–34)
MCHC RBC AUTO-ENTMCNC: 34.5 G/DL (ref 31–36)
MCV RBC AUTO: 93.5 FL (ref 80–100)
PDW BLD-RTO: 12.8 % (ref 12.4–15.4)
PHOSPHORUS: 3.1 MG/DL (ref 2.5–4.9)
PLATELET # BLD: 153 K/UL (ref 135–450)
PMV BLD AUTO: 9.8 FL (ref 5–10.5)
POTASSIUM SERPL-SCNC: 4.5 MMOL/L (ref 3.5–5.1)
RBC # BLD: 4.67 M/UL (ref 4.2–5.9)
SODIUM BLD-SCNC: 145 MMOL/L (ref 136–145)
TOTAL PROTEIN: 6.7 G/DL (ref 6.4–8.2)
TRIGL SERPL-MCNC: 73 MG/DL (ref 0–150)
TSH SERPL DL<=0.05 MIU/L-ACNC: 2.67 UIU/ML (ref 0.27–4.2)
VLDLC SERPL CALC-MCNC: 15 MG/DL
WBC # BLD: 5.5 K/UL (ref 4–11)

## 2021-04-22 PROCEDURE — 99395 PREV VISIT EST AGE 18-39: CPT | Performed by: INTERNAL MEDICINE

## 2021-04-22 PROCEDURE — 36415 COLL VENOUS BLD VENIPUNCTURE: CPT | Performed by: INTERNAL MEDICINE

## 2021-04-22 SDOH — ECONOMIC STABILITY: FOOD INSECURITY: WITHIN THE PAST 12 MONTHS, THE FOOD YOU BOUGHT JUST DIDN'T LAST AND YOU DIDN'T HAVE MONEY TO GET MORE.: NEVER TRUE

## 2021-04-22 SDOH — ECONOMIC STABILITY: INCOME INSECURITY: HOW HARD IS IT FOR YOU TO PAY FOR THE VERY BASICS LIKE FOOD, HOUSING, MEDICAL CARE, AND HEATING?: NOT VERY HARD

## 2021-04-22 SDOH — ECONOMIC STABILITY: TRANSPORTATION INSECURITY
IN THE PAST 12 MONTHS, HAS THE LACK OF TRANSPORTATION KEPT YOU FROM MEDICAL APPOINTMENTS OR FROM GETTING MEDICATIONS?: NO

## 2021-04-22 ASSESSMENT — ENCOUNTER SYMPTOMS
ABDOMINAL PAIN: 1
ALLERGIC/IMMUNOLOGIC NEGATIVE: 1
EYES NEGATIVE: 1
RESPIRATORY NEGATIVE: 1

## 2021-04-22 NOTE — PATIENT INSTRUCTIONS
Annual Physical Exam. Flu shot in Oct/Nov. COVID #2 as scheduled. Llq Abdominal Pain. To call if returns. Will do CT abdomen w/ contrast. Probably diverticulosis. Abnormal Lfts (Liver Function Tests). Decrease Alcohol and exercise. Will do labs and adjust tx if needed. Obstructive Sleep Apnea Syndrome. To Sleep clinic for new mask. Prostate: na  Colonoscopy: na  DEXA: na  Eye: appt 4/2021. Hearing: passed in office exam  Immunization:  Flu shot in Oct/Nov. COVID #2 as scheduled.   MMSE: na  Get Up and Go: na  Tob: nonsmoker/never  ETOH: 10-12 drinks/week  Caffeine: heavy  Cardiac Risk Assessment: labs pending  Living will: no  Medical power of : no

## 2021-04-22 NOTE — PROGRESS NOTES
discharge. Left eye: No discharge. Conjunctiva/sclera: Conjunctivae normal.      Pupils: Pupils are equal, round, and reactive to light. Funduscopic exam:     Right eye: No hemorrhage, exudate, AV nicking, arteriolar narrowing or papilledema. Left eye: No hemorrhage, exudate, AV nicking, arteriolar narrowing or papilledema. Neck:      Musculoskeletal: Full passive range of motion without pain, normal range of motion and neck supple. No neck rigidity or muscular tenderness. Thyroid: No thyroid mass or thyromegaly. Vascular: Normal carotid pulses. No carotid bruit, hepatojugular reflux or JVD. Trachea: Trachea normal. No tracheal deviation. Cardiovascular:      Rate and Rhythm: Normal rate and regular rhythm. No extrasystoles are present. Chest Wall: PMI is not displaced. Pulses: Normal pulses. No decreased pulses. Carotid pulses are 2+ on the right side and 2+ on the left side. Radial pulses are 2+ on the right side and 2+ on the left side. Femoral pulses are 2+ on the right side and 2+ on the left side. Popliteal pulses are 2+ on the right side and 2+ on the left side. Dorsalis pedis pulses are 2+ on the right side and 2+ on the left side. Posterior tibial pulses are 2+ on the right side and 2+ on the left side. Heart sounds: Normal heart sounds. No murmur. No friction rub. No gallop. Pulmonary:      Effort: Pulmonary effort is normal. No tachypnea, bradypnea, accessory muscle usage or respiratory distress. Breath sounds: Normal breath sounds. No stridor. No decreased breath sounds, wheezing, rhonchi or rales. Chest:      Chest wall: No tenderness. Abdominal:      General: Bowel sounds are normal. There is no distension or abdominal bruit. Palpations: Abdomen is soft. There is no shifting dullness, fluid wave, mass or pulsatile mass. Tenderness: There is no abdominal tenderness.  There is no right CVA tenderness, left CVA tenderness, guarding or rebound. Hernia: No hernia is present. There is no hernia in the ventral area or left inguinal area. Genitourinary:     Penis: Normal. No tenderness. Testes: Normal. Cremasteric reflex is present. Musculoskeletal: Normal range of motion. General: No swelling, tenderness, deformity or signs of injury. Right lower leg: No edema. Left lower leg: No edema. Lymphadenopathy:      Head:      Right side of head: No submental, submandibular, tonsillar, preauricular, posterior auricular or occipital adenopathy. Left side of head: No submental, submandibular, tonsillar, preauricular, posterior auricular or occipital adenopathy. Cervical: No cervical adenopathy. Upper Body:      Right upper body: No supraclavicular or epitrochlear adenopathy. Left upper body: No supraclavicular or epitrochlear adenopathy. Skin:     General: Skin is warm and dry. Capillary Refill: Capillary refill takes less than 2 seconds. Coloration: Skin is not jaundiced or pale. Findings: No abrasion, bruising, erythema, lesion or rash. Nails: There is no clubbing. Neurological:      General: No focal deficit present. Mental Status: He is alert and oriented to person, place, and time. Mental status is at baseline. He is not disoriented. Cranial Nerves: No cranial nerve deficit. Sensory: No sensory deficit. Motor: No weakness, tremor, atrophy, abnormal muscle tone or seizure activity. Coordination: Coordination normal.      Gait: Gait normal.      Deep Tendon Reflexes: Reflexes are normal and symmetric. Reflexes normal. Babinski sign absent on the right side. Babinski sign absent on the left side. Reflex Scores:       Tricep reflexes are 2+ on the right side and 2+ on the left side. Bicep reflexes are 2+ on the right side and 2+ on the left side.        Brachioradialis reflexes are 2+ on the right side and 2+ on the left side. Patellar reflexes are 2+ on the right side and 2+ on the left side. Achilles reflexes are 2+ on the right side and 2+ on the left side. Psychiatric:         Mood and Affect: Mood normal.         Speech: Speech normal.         Behavior: Behavior normal.         Thought Content: Thought content normal.         Judgment: Judgment normal.         Assessment / Plan:     Annual Physical Exam. Flu shot in Oct/Nov. COVID #2 as scheduled. Llq Abdominal Pain. To call if returns. Will do CT abdomen w/ contrast. Probably diverticulosis. Abnormal Lfts (Liver Function Tests). Decrease Alcohol and exercise. Will do labs and adjust tx if needed. Obstructive Sleep Apnea Syndrome. To Sleep clinic for new mask.

## 2021-04-22 NOTE — ASSESSMENT & PLAN NOTE
Prostate: na  Colonoscopy: na  DEXA: na  Eye: appt 4/2021. Hearing: passed in office exam  Immunization:  Flu shot in Oct/Nov. COVID #2 as scheduled.   MMSE: na  Get Up and Go: na  Tob: nonsmoker/never  ETOH: 10-12 drinks/week  Caffeine: heavy  Cardiac Risk Assessment: labs pending  Living will: no  Medical power of : no

## 2021-04-23 LAB
HIV AG/AB: NORMAL
HIV ANTIGEN: NORMAL
HIV-1 ANTIBODY: NORMAL
HIV-2 AB: NORMAL

## 2021-04-30 ENCOUNTER — IMMUNIZATION (OUTPATIENT)
Dept: PRIMARY CARE CLINIC | Age: 31
End: 2021-04-30
Payer: COMMERCIAL

## 2021-04-30 PROCEDURE — 91301 COVID-19, MODERNA VACCINE 100MCG/0.5ML DOSE: CPT | Performed by: FAMILY MEDICINE

## 2021-04-30 PROCEDURE — 0012A COVID-19, MODERNA VACCINE 100MCG/0.5ML DOSE: CPT | Performed by: FAMILY MEDICINE

## 2021-06-08 ENCOUNTER — OFFICE VISIT (OUTPATIENT)
Dept: FAMILY MEDICINE CLINIC | Age: 31
End: 2021-06-08
Payer: COMMERCIAL

## 2021-06-08 VITALS
HEIGHT: 74 IN | BODY MASS INDEX: 27.46 KG/M2 | WEIGHT: 214 LBS | SYSTOLIC BLOOD PRESSURE: 138 MMHG | OXYGEN SATURATION: 96 % | HEART RATE: 82 BPM | DIASTOLIC BLOOD PRESSURE: 88 MMHG

## 2021-06-08 DIAGNOSIS — H53.9 VISUAL DISTURBANCE: Primary | ICD-10-CM

## 2021-06-08 PROCEDURE — 99213 OFFICE O/P EST LOW 20 MIN: CPT | Performed by: PHYSICIAN ASSISTANT

## 2021-06-08 ASSESSMENT — ENCOUNTER SYMPTOMS: RESPIRATORY NEGATIVE: 1

## 2021-06-08 NOTE — PROGRESS NOTES
Subjective:      Patient ID: Kirsten Seen is a 33 y.o. male. HPI  Patient presents with a dark [place] in the center of his vision of his left eye. The is no pain. Within the [place] of vision there is blurring or distortion. No headache, no floaters or other symptoms. Feels pressure in the eye. Worse with light or squinting. Review of Systems   Constitutional: Negative. Eyes: Positive for visual disturbance. Respiratory: Negative. Cardiovascular: Negative. Objective:   Physical Exam  Constitutional:       Appearance: Normal appearance. Eyes:      Extraocular Movements: Extraocular movements intact. Conjunctiva/sclera: Conjunctivae normal.      Pupils: Pupils are equal, round, and reactive to light. Neurological:      General: No focal deficit present. Mental Status: He is alert and oriented to person, place, and time. Assessment / Plan:          Diagnosis Orders   1.  Visual disturbance       Appointment with [facility] today at 3:50